# Patient Record
Sex: FEMALE | Race: WHITE | NOT HISPANIC OR LATINO | Employment: OTHER | URBAN - METROPOLITAN AREA
[De-identification: names, ages, dates, MRNs, and addresses within clinical notes are randomized per-mention and may not be internally consistent; named-entity substitution may affect disease eponyms.]

---

## 2021-12-21 ENCOUNTER — OFFICE VISIT (OUTPATIENT)
Dept: UROLOGY | Facility: CLINIC | Age: 76
End: 2021-12-21

## 2021-12-21 VITALS
HEIGHT: 62 IN | BODY MASS INDEX: 18.22 KG/M2 | WEIGHT: 99 LBS | SYSTOLIC BLOOD PRESSURE: 142 MMHG | TEMPERATURE: 96.9 F | DIASTOLIC BLOOD PRESSURE: 72 MMHG | HEART RATE: 82 BPM

## 2021-12-21 DIAGNOSIS — N39.0 URINARY TRACT INFECTION WITHOUT HEMATURIA, SITE UNSPECIFIED: Primary | ICD-10-CM

## 2021-12-21 DIAGNOSIS — N39.0 URINARY TRACT INFECTION WITHOUT HEMATURIA, SITE UNSPECIFIED: ICD-10-CM

## 2021-12-21 DIAGNOSIS — N13.30 HYDRONEPHROSIS, UNSPECIFIED HYDRONEPHROSIS TYPE: Primary | ICD-10-CM

## 2021-12-21 DIAGNOSIS — F41.9 ANXIETY: ICD-10-CM

## 2021-12-21 DIAGNOSIS — N39.45 CONTINUOUS LEAKAGE OF URINE: ICD-10-CM

## 2021-12-21 DIAGNOSIS — Z85.41 HISTORY OF CERVICAL CANCER IN ADULTHOOD: ICD-10-CM

## 2021-12-21 LAB
BILIRUB BLD-MCNC: NEGATIVE MG/DL
CLARITY, POC: ABNORMAL
COLOR UR: YELLOW
EXPIRATION DATE: ABNORMAL
GLUCOSE UR STRIP-MCNC: NEGATIVE MG/DL
KETONES UR QL: NEGATIVE
LEUKOCYTE EST, POC: ABNORMAL
Lab: ABNORMAL
NITRITE UR-MCNC: POSITIVE MG/ML
PH UR: 5.5 [PH] (ref 5–8)
PROT UR STRIP-MCNC: ABNORMAL MG/DL
RBC # UR STRIP: ABNORMAL /UL
SP GR UR: 1.02 (ref 1–1.03)
UROBILINOGEN UR QL: NORMAL

## 2021-12-21 PROCEDURE — 99205 OFFICE O/P NEW HI 60 MIN: CPT | Performed by: NURSE PRACTITIONER

## 2021-12-21 PROCEDURE — 87086 URINE CULTURE/COLONY COUNT: CPT | Performed by: NURSE PRACTITIONER

## 2021-12-21 PROCEDURE — 87186 SC STD MICRODIL/AGAR DIL: CPT | Performed by: NURSE PRACTITIONER

## 2021-12-21 PROCEDURE — 81003 URINALYSIS AUTO W/O SCOPE: CPT | Performed by: NURSE PRACTITIONER

## 2021-12-21 RX ORDER — ATENOLOL 50 MG/1
25 TABLET ORAL 2 TIMES DAILY
COMMUNITY
Start: 2021-11-15

## 2021-12-21 RX ORDER — DOXYCYCLINE HYCLATE 50 MG/1
1 CAPSULE, GELATIN COATED ORAL 2 TIMES DAILY
COMMUNITY
Start: 2021-12-15

## 2021-12-21 RX ORDER — LEVOTHYROXINE SODIUM 0.05 MG/1
50 TABLET ORAL DAILY
COMMUNITY
Start: 2021-11-15

## 2021-12-21 NOTE — PROGRESS NOTES
"Chief Complaint  Scheduled for \"voiding issues\"    Subjective          Emani Wise 76 y.o. female presents to [facility] UROLOGY  Referred by Roselia Partida,  for second opinion per another urologist regarding having to intermittent self cath.  Apparently she has a history of urinary retention incomplete bladder emptying and according to office note from pcp. The actual referral diagnosis listed as recurrent uti. Patient is here with daughter and states that her main problem is that she leaks and drip urine all the time. all the time and cant afford to buy attends. Does not ISC because believes causes her to have uti. She not ISC in quite some time. When questioned regarding why told that she had to ISC, unable to give me specifics. Daughter states that she was hospitalized 2020 and was septic and at some point had covid. Daughter was not allowed to visit mother during covid situation and has not accompanied mother to physicians in the past year.  Patient denies  any problems with incontinence or uti prior to hospitalization. Both daughter and patient have conflicting recollections and unable to give medical history detail..  Contacted Urologist from [facility] to obtain office note to see why they had recommended ISC. 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 Asked patient if she had ever had a cystoscopy by her urologist, she states she did not know unless completed at time of her problems with cervix. Both patient and patient's daughter conflicting recollections.      Review of Systems   Constitutional: Negative for chills and fever.   Genitourinary: Negative for hematuria.   Psychiatric/Behavioral: The patient is nervous/anxious.       Objective   Vital Signs:   /72   Pulse 82   Temp 96.9 °F (36.1 °C)   Ht 157.5 cm (62\")   Wt 44.9 kg (99 lb)   BMI 18.11 kg/m²      Past Surgical History:   Procedure " Laterality Date   • COLON SURGERY     • ENDOSCOPY AND COLONOSCOPY     • HYSTERECTOMY          Physical Exam  Vitals and nursing note reviewed. Exam conducted with a chaperone present (daughter present).   Constitutional:       General: She is not in acute distress.     Appearance: She is well-developed and underweight. She is not ill-appearing.      Comments: Ambulates without difficulty   Cardiovascular:      Rate and Rhythm: Normal rate and regular rhythm.   Pulmonary:      Effort: Pulmonary effort is normal.      Breath sounds: No wheezing or rales.   Abdominal:      General: There is no distension.      Palpations: Abdomen is soft. There is no mass.      Tenderness: There is no abdominal tenderness. There is no guarding or rebound.   Musculoskeletal:         General: Normal range of motion.   Skin:     General: Skin is warm and dry.   Neurological:      General: No focal deficit present.      Mental Status: She is alert and oriented to person, place, and time.   Psychiatric:      Comments: Anxious and difficulty answering questions.         Result Review :     POC Urinalysis Dipstick, Automated (12/21/2021 15:07)             POC Urinalysis Dipstick, Automated (12/21/2021 15:07)  [facility] - [email or web address] (12/15/2021)      UROLOGY - SCAN - urology note from Dr. Jo (12/21/2021)    Assessment and Plan    Diagnoses and all orders for this visit:    1. Hydronephrosis, unspecified hydronephrosis type (Primary)    2. Urinary tract infection without hematuria, site unspecified  -     POC Urinalysis Dipstick, Automated    3. History of cervical cancer in adulthood    4. Anxiety    5. Continuous leakage of urine    Other orders  -     Bladder Scan    After patient and daughter in office for over 90 minutes attempting to get correct history and information, apparently patient has appointment schedule with urologist already in [place] and did not want to wait until her  appointment scheduled next month so requested to be scheduled somewhere sooner. Since she has issues with hydropnephrosis chronic and will need minimum of a cystoscopy and possible bilateral retrograde in hospital setting to be sure no ureteral strictures. Advised to keep her appointment the the physician in [place]. I am unable to perform these procedures and dr Patiño is not performing procedures in hospital anylonger.  Bladder scan in office after void approximately 45cc. Will send urine for culture and patient aware that it may be Monday before final result of sensitivity. Agree to call with results and possible antibiotic. I have advised her to keep request copies of office notes from her providers if she is unable to remember or recall information.  I can treat her urine culture once resulted then turn over care of patient to her urologist scheduled in [place] for continuity of care. They can schedule repeat renal ultrasound if they wish to recheck. 3 urologist at one time creating more confusion. Patient does not want to return to urologist in [facility].  I spent 70 plus minutes caring for Emani on this date of service. This time includes time spent by me in the following activities:preparing for the visit, reviewing tests, obtaining and/or reviewing a separately obtained history, performing a medically appropriate examination and/or evaluation , counseling and educating the patient/family/caregiver, ordering medications, tests, or procedures, referring and communicating with other health care professionals , documenting information in the medical record, independently interpreting results and communicating that information with the patient/family/caregiver, care coordination and bladder scan in office discussed  Follow Up   Return for patient to keep appointment with her new urologist as scheduled. .  Patient was given instructions and counseling regarding her condition or for health maintenance  advice. Please see specific information pulled into the AVS if appropriate.     Shayna Yo, APRN

## 2021-12-27 LAB
BACTERIA UR CULT: ABNORMAL
BACTERIA UR CULT: ABNORMAL
OTHER ANTIBIOTIC SUSC ISLT: ABNORMAL

## 2021-12-28 DIAGNOSIS — N39.0 URINARY TRACT INFECTION WITHOUT HEMATURIA, SITE UNSPECIFIED: Primary | ICD-10-CM

## 2021-12-28 RX ORDER — CEPHALEXIN 500 MG/1
500 CAPSULE ORAL EVERY 12 HOURS
Qty: 14 CAPSULE | Refills: 0 | Status: SHIPPED | OUTPATIENT
Start: 2021-12-28 | End: 2022-01-04

## 2023-07-12 NOTE — PROGRESS NOTES
FAMILY PRACTICE OFFICE VISIT       NAME: Nick Albrecht  AGE: 71 y.o. SEX: female       : 1953        MRN: [id number]    Assessment and Plan   1. Type 2 diabetes mellitus without complication, without long-term current use of insulin (HCC)  Assessment & Plan:    Lab Results   Component Value Date    HGBA1C 5.9 (H) 2023     A1c is well controlled on Prandin and Trulicity. Orders:  -     Hemoglobin A1C; Future; Expected date: 2024    2. Benign essential hypertension  Assessment & Plan:  Blood pressure is elevated. Metoprolol changed to Coreg by cardiology. Has not started yet, due to she has a lot of driving to do this weekend, and will start after she is done her traveling. Orders:  -     CBC; Future; Expected date: 2024  -     Comprehensive metabolic panel; Future; Expected date: 2024  -     TSH, 3rd generation with Free T4 reflex; Future; Expected date: 2024    3. Mixed hyperlipidemia  Assessment & Plan:  LDL 89. Continue Pravastatin. Repeat lipid panel in 6 months. Orders:  -     Lipid panel; Future; Expected date: 2024    4. Obstructive sleep apnea  Assessment & Plan:  Not able to tolerate CPAP. 5. Gastroesophageal reflux disease without esophagitis  Assessment & Plan:  Pepcid as needed, not often. 6. Osteopenia, unspecified location  Assessment & Plan:  Due for repeat Dexa. 7. Post-menopausal  -     DXA bone density spine hip and pelvis; Future; Expected date: 2023         Repeat blood work and follow up in 6 months or sooner if needed. Chief Complaint     Chief Complaint   Patient presents with   • Follow-up     Pt is here for 6 mos f/u       History of Present Illness         Nick Albrecht is a 71year old female presenting today for 6 month check up. She is feeling well and has no complaints today. Review of Systems   Review of Systems   Constitutional: Negative. HENT: Negative.     Respiratory: Please notify patient today that her urine culture and sensitivity report did come in today. Positive for proteus mirabilis and May send in prescription for keflex 500mg every 12 hours for 7 days.  New Urologist in [place] to follow for both hydronephrosis and problems with any further uti. Verify which pharmacy wishing to have medication sent. Thank you Negative. Cardiovascular: Negative. Gastrointestinal: Negative. Genitourinary: Negative. Musculoskeletal: Negative. Skin: Negative. Neurological: Negative. Hematological: Negative. Psychiatric/Behavioral: Negative. I have reviewed the patient's medical history in detail; there are no changes to the history as noted in the electronic medical record. Objective     Vitals:    07/12/23 0722   BP: 140/100   Pulse: 71   Resp: 16   Temp: 97.8 °F (36.6 °C)   TempSrc: Temporal   SpO2: 98%   Weight: 81.6 kg (180 lb)   Height: 5' 1" (1.549 m)     Wt Readings from Last 3 Encounters:   07/12/23 81.6 kg (180 lb)   07/10/23 81.4 kg (179 lb 8 oz)   05/08/23 80.7 kg (178 lb)     Physical Exam  Vitals and nursing note reviewed. Constitutional:       General: She is not in acute distress. Appearance: Normal appearance. She is not ill-appearing. HENT:      Head: Atraumatic. Right Ear: Tympanic membrane normal.      Left Ear: Tympanic membrane normal.      Mouth/Throat:      Mouth: Mucous membranes are moist.      Pharynx: Oropharynx is clear. Neck:      Thyroid: No thyromegaly. Cardiovascular:      Rate and Rhythm: Normal rate and regular rhythm. Heart sounds: No murmur heard. Pulmonary:      Effort: Pulmonary effort is normal. No respiratory distress. Breath sounds: Normal breath sounds. Musculoskeletal:      Cervical back: Normal range of motion and neck supple. Right lower leg: No edema. Left lower leg: No edema. Lymphadenopathy:      Cervical: No cervical adenopathy. Neurological:      Mental Status: She is alert. Psychiatric:         Mood and Affect: Mood normal.       BMI Counseling: Body mass index is 34.01 kg/m². The BMI is above normal. Exercise recommendations include exercising 3-5 times per week. Rationale for BMI follow-up plan is due to patient being overweight or obese.          ALLERGIES:  Allergies   Allergen Reactions   • Atorvastatin    • Sulfa Antibiotics Myalgia   • Sulfamethopyrazine      Joint stiffness   • Sulfamethoxazole-Trimethoprim Other (See Comments)     Joint pain       Current Medications     Current Outpatient Medications   Medication Sig Dispense Refill   • acetaminophen (TYLENOL) 500 mg tablet Take 500 mg by mouth every 6 (six) hours as needed for mild pain     • amLODIPine (NORVASC) 10 mg tablet TAKE 1 TABLET BY MOUTH EVERY DAY WITH DINNER 90 tablet 3   • Blood Glucose Monitoring Suppl (OneTouch Verio Flex System) w/Device KIT USE DAILY AND WHEN HAVING SYMPTOMS AS DIRECTED TO CHECK BLOOD SUGARS 1 kit 0   • Cholecalciferol (VITAMIN D3) 1000 units CAPS Take by mouth     • dulaglutide (Trulicity) 7.36 IP/0.3TM injection Inject 0.5 mL (0.75 mg total) under the skin every 7 days 6 mL 3   • famotidine (PEPCID) 10 mg tablet Take 10 mg by mouth daily as needed      • Flaxseed Oil OIL by Does not apply route     • fluticasone (FLONASE) 50 mcg/act nasal spray 2 sprays into each nostril daily as needed      • glucose blood (OneTouch Verio) test strip Use daily and when having symptoms as directed to check blood sugars 100 each PRN   • ibuprofen (MOTRIN) 200 mg tablet Take by mouth every 6 (six) hours as needed for mild pain     • Lactobacillus (PROBIOTIC ACIDOPHILUS) CAPS Take 1 tablet daily as supplement.      • Lancet Devices (ONE TOUCH DELICA LANCING DEV) MISC Use daily and when having symptoms as directed to check blood sugars 100 each PRN   • Lancets (OneTouch Delica Plus [id number]) MISC Use daily and when having symptoms as directed to check blood sugars 100 each 3   • lisinopril (ZESTRIL) 40 mg tablet TAKE 1 TABLET (40 MG TOTAL) BY MOUTH DAILY IN THE EARLY MORNING 90 tablet 3   • loratadine (CLARITIN) 10 mg tablet Take 1 tablet by mouth daily as needed     • pravastatin (PRAVACHOL) 20 mg tablet TAKE 1 TABLET BY MOUTH DAILY AT BEDTIME 90 tablet 3   • repaglinide (PRANDIN) 0.5 mg tablet TAKE 1 TABLET (0.5 MG TOTAL) BY MOUTH DAILY WITH DINNER 90 tablet 3   • carvedilol (COREG) 6.25 mg tablet Take 1 tablet (6.25 mg total) by mouth 2 (two) times a day with meals (Patient not taking: Reported on 7/12/2023) 180 tablet 3     No current facility-administered medications for this visit.          Health Maintenance     Health Maintenance   Topic Date Due   • PT PLAN OF CARE  10/22/2020   • COVID-19 Vaccine (4 - Pfizer series) 02/10/2022   • Influenza Vaccine (1) 09/01/2023   • Medicare Annual Wellness Visit (AWV)  09/12/2023   • Urinary Incontinence Screening  09/12/2023   • HEMOGLOBIN A1C  01/07/2024   • Depression Screening  04/13/2024   • Diabetic Foot Exam  04/13/2024   • Fall Risk  05/08/2024   • Kidney Health Evaluation: GFR  07/07/2024   • Kidney Health Evaluation: Albumin/Creatinine Ratio  07/07/2024   • BMI: Followup Plan  07/12/2024   • BMI: Adult  07/12/2024   • DM Eye Exam  08/03/2024   • Breast Cancer Screening: Mammogram  01/25/2025   • Colorectal Cancer Screening  11/02/2025   • Hepatitis C Screening  Completed   • Osteoporosis Screening  Completed   • Pneumococcal Vaccine: 65+ Years  Completed   • HIB Vaccine  Aged Out   • IPV Vaccine  Aged Out   • Hepatitis A Vaccine  Aged Out   • Meningococcal ACWY Vaccine  Aged Out   • HPV Vaccine  Aged Out     Immunization History   Administered Date(s) Administered   • COVID-19 PFIZER VACCINE 0.3 ML IM 04/10/2021, 05/01/2021, 12/16/2021   • INFLUENZA 01/05/2016, 10/18/2018, 09/18/2022   • Influenza Quadrivalent Preservative Free 3 years and older IM 10/04/2017   • Influenza Split High Dose Preservative Free IM 10/11/2019   • Influenza, high dose seasonal 0.7 mL 09/08/2020, 09/10/2021   • Influenza, seasonal, injectable 09/01/2014, 01/05/2016, 10/26/2016   • Pneumococcal Conjugate 13-Valent 12/18/2018   • Pneumococcal Polysaccharide PPV23 07/12/2016, 03/22/2021   • Tdap 07/17/2018   • Zoster 01/01/2014, 08/30/2016       JOSHUA Vora